# Patient Record
Sex: MALE | Race: WHITE
[De-identification: names, ages, dates, MRNs, and addresses within clinical notes are randomized per-mention and may not be internally consistent; named-entity substitution may affect disease eponyms.]

---

## 2018-10-27 ENCOUNTER — HOSPITAL ENCOUNTER (OUTPATIENT)
Dept: HOSPITAL 62 - RAD | Age: 28
End: 2018-10-27
Attending: OTOLARYNGOLOGY
Payer: SELF-PAY

## 2018-10-27 DIAGNOSIS — H91.20: Primary | ICD-10-CM

## 2018-10-27 PROCEDURE — 70553 MRI BRAIN STEM W/O & W/DYE: CPT

## 2018-10-27 PROCEDURE — A9576 INJ PROHANCE MULTIPACK: HCPCS

## 2018-10-28 NOTE — RADIOLOGY REPORT (SQ)
EXAM DESCRIPTION:  MRI HEAD COMBO



COMPLETED DATE/TIME:  10/27/2018 2:51 pm



REASON FOR STUDY:  SUDDEN IDIOPATHIC HEARING LOSS H91.20  SUDDEN IDIOPATHIC HEARING LOSS, UNSPECIFIED
 EAR



COMPARISON:  None.



TECHNIQUE:  Multiplanar imaging includes noncontrasted T1, T2, FLAIR, diffusion with ADC map and post
gadolinium contrast T1 sequences.  Additional thin sections through the internal auditory canals.  Im
ages stored on PACS.



CONTRAST TYPE AND DOSE:  20 mL Dotarem.



RENAL FUNCTION:  GFR > 60.



LIMITATIONS:  None.



FINDINGS:  ANATOMY: No anomalies. Normal vascular flow voids. Pituitary fossa normal.

CSF SPACES: Normal in size and contour. No hemorrhage.

CEREBRUM: Sulci and gyri normal in size and contour. Normal white matter signal on FLAIR imaging. No 
evidence of hemorrhage, mass, or extraaxial fluid collection. No abnormal enhancement post contrast.

POSTERIOR FOSSA: No signal alteration. No hemorrhage. No edema, masses, or mass effect. Internal mukund
tory canals, cerebellopontine angles, mastoids normal.  Normal vestibular aqueduct.  No enhancing les
ions. No abnormal enhancement post contrast.

DIFFUSION IMAGING: Negative for acute or subacute infarction.

ORBITS: No masses. Globes normal.

PARANASAL SINUSES: No fluid levels. Mucosa normal.

OTHER: No other significant finding.



IMPRESSION:  Normal brain.  Normal IAC's.

EVIDENCE OF ACUTE STROKE: NO.



TECHNICAL DOCUMENTATION:  JOB ID:  [id number]

 2011 [employer]- All Rights Reserved



Reading location - IP/workstation name: [email or web address]